# Patient Record
Sex: FEMALE | Race: ASIAN | NOT HISPANIC OR LATINO | Employment: UNEMPLOYED | URBAN - METROPOLITAN AREA
[De-identification: names, ages, dates, MRNs, and addresses within clinical notes are randomized per-mention and may not be internally consistent; named-entity substitution may affect disease eponyms.]

---

## 2020-07-01 ENCOUNTER — OFFICE VISIT (OUTPATIENT)
Dept: OBSTETRICS AND GYNECOLOGY | Facility: CLINIC | Age: 30
End: 2020-07-01
Payer: MEDICAID

## 2020-07-01 VITALS
HEIGHT: 61 IN | SYSTOLIC BLOOD PRESSURE: 112 MMHG | BODY MASS INDEX: 21.71 KG/M2 | WEIGHT: 115 LBS | DIASTOLIC BLOOD PRESSURE: 84 MMHG

## 2020-07-01 DIAGNOSIS — Z01.419 WELL WOMAN EXAM WITH ROUTINE GYNECOLOGICAL EXAM: Primary | ICD-10-CM

## 2020-07-01 DIAGNOSIS — Z12.4 ENCOUNTER FOR PAPANICOLAOU SMEAR FOR CERVICAL CANCER SCREENING: ICD-10-CM

## 2020-07-01 PROCEDURE — 99385 PREV VISIT NEW AGE 18-39: CPT | Mod: S$GLB,,, | Performed by: OBSTETRICS & GYNECOLOGY

## 2020-07-01 PROCEDURE — 99385 PR PREVENTIVE VISIT,NEW,18-39: ICD-10-PCS | Mod: S$GLB,,, | Performed by: OBSTETRICS & GYNECOLOGY

## 2020-07-01 NOTE — PATIENT INSTRUCTIONS
The Range of Pap Test Results  When your Pap test is sent to the lab, the lab studies your cell samples and reports any abnormal cell changes. Your health care provider can discuss these changes with you. In some cases, an abnormal Pap test is due to an infection. More serious cell changes range from dysplasia to cancer. Talk to your health care provider about your Pap test.    Normal results  Cervical cells, even normal ones, are always changing. As they mature, normal squamous cells move from deeper layers within the cervix. Over time, these cells flatten and cover the surface of the cervix. Within the cervical canal, the cells are different. These glandular cells are taller and not as flat as the cells on the surface of the cervix. When a Pap test sample shows healthy cells of both types, the results are negative. Keep having Pap tests as often as directed.  Abnormal results  A positive Pap test result means some cells in the sample showed abnormal changes. These results are grouped by the type of cell change and the location, or extent, of the changes. Depending on the results, you may need further testing.  · Inflammation: Noncancerous changes are present. They may be due to normal cell repair. Or, they may be caused by an infection, such as HPV or yeast. Further testing may be needed. (Also called reactive cellular changes.)  · Atypical squamous cells: Test results are unclear. Cells on the surface of the cervix show changes, but their significance is not yet known. Testing for HPV and other sexually transmitted infections (STIs) may be needed. Treatment may be required. (Reported as ASC-US or ASC-H.)  · Atypical glandular cells: Cells lining the cervical canal show abnormal changes. Further testing is likely. You may also have treatment to destroy or remove problem cells. (Reported as AGC.)  · Mild dysplasia: Cells show distinct changes. More testing or HPV typing may be done. You may also have treatment to  destroy or remove problem cells. (Reported as low-grade HANNA or YASHIRA 1.)  · Moderate to severe dysplasia: Cells show precancerous changes. Or, noninvasive cancer (carcinoma in situ) may be present. Treatment to destroy or remove problem cells is likely. (Reported as high-grade HANNA or YASHIRA 2 or YASHIRA 3.)  · Cancer: Different types of cancer may be detected by your Pap test. More tests to assess the cancer's extent are likely. The type of treatment will depend on the test results and other factors, such as age and health history. (Reported as squamous cell carcinoma, endocervical adenocarcinoma in situ, or adenocarcinoma.)  Date Last Reviewed: 5/12/2015  © 7085-2574 [employer]. [address] [place] [address], [place] [address]. All rights reserved. This information is not intended as a substitute for professional medical care. Always follow your healthcare professional's instructions.          When You Have an Abnormal Pap Test    The Pap test is a screening test that checks for cell changes in the cervix, the opening of the uterus. In some cases, it checks for a virus that can cause cervical cancer. If your Pap results were abnormal, you may be worried. But there is no reason to panic. An abnormal Pap test result can mean many things. It may be due to changes (inflammation) caused by normal cell repair or infection. Or you may have a problem called dysplasia that could become cervical cancer. If so, know that dysplasia tends to progress very slowly before becoming cervical cancer. Thats why its so important to have Pap tests as often as directed. Pap tests can show cell changes in the cervix early, when treatment is most effective.  Talk to your health care provider  Be sure to discuss your results with your health care provider. Find out about any follow-up tests youll need. You may be asked to come back for a second Pap test in a few months. Or, you may be scheduled for an exam so your health care provider can  get a closer look at your cervix. In either case, be sure to keep your follow-up visits. They are one of your best safeguards against future problems.  Understanding your risk  Some lifestyle choices can increase your risk of abnormal cell changes. Did you start having sex at a young age? Have you had many sexual partners? Have you had sex without using a latex condom? Do you smoke? If you answered yes to any of these questions, you are more at risk. One of the most common reasons for an abnormal Pap result is infection with the human papillomavirus (HPV). If your Pap results suggest HPV, further testing may be needed.     The Pap test  During the test:  · An instrument called a speculum is inserted into the vagina to hold it open. This lets your health care provider see the cervix.  · A small brush or swab is used to take cells from several areas of the cervix. The cells are put into a liquid or on a slide. They are then sent to a lab where they are studied for changes. Your health care provider will contact you with the results.   Date Last Reviewed: 4/26/2015 © 2000-2017 [employer]. [address] [place] [address]. All rights reserved. This information is not intended as a substitute for professional medical care. Always follow your healthcare professional's instructions.          Human Papillomavirus (HPV)  Does this test have other names?  HPV DNA test, DNA Pap, HPV co-test  What is this test?  This test checks for the human papillomavirus (HPV) around the cervix. HPVs can cause warts, including plantar warts on the bottom of the feet and genital warts. They can also cause different kinds of cancers, including cervical, throat, and anal cancers.  More than 100 types of HPVs have been identified. Relatively few carry a high cancer risk. HPV can travel from person to person during sexual contact. In fact, it's one of the most widely spread sexually transmitted diseases (STDs).    Why do  I need this test?  You may need this test to see whether you have HPV. Because long-term infection with HPV is the greatest risk factor for cervical cancer, this test is commonly used to check women for viruses that could cause this cancer. The test may be done at the same time as a Pap test, which checks for abnormal cervical cells or cervical cancer.  The American Cancer Society (ACS) suggests that women ages 30 and older have a Pap test every 5 years along with an HPV test. Another reasonable choice for women ages 30 to 65 is to get tested every 3 years with just the Pap test.  The HPV test is not recommended as a cervical cancer screening test for sexually active women in their 20s. These women are much more likely to have an HPV infection that will go away on its own, so the results of an HPV test are less likely to be useful. But the HPV test might be done if a woman in her 20s has an abnormal Pap test.  Although HPV also causes anal cancer and healthcare providers can check for signs of abnormal cells in the anus, testing for cancer-causing HPV in the anus is not currently common.   What other tests might I have along with this test?  Your healthcare provider may do a Pap test. This involves collecting a sample to check for abnormal cells. If needed, your provider may also check for gonorrhea and chlamydia, two other STDs.  What do my test results mean?  Many things may affect your lab test results. These include the method each lab uses to do the test. Even if your test results are different from the normal value, you may not have a problem. To learn what the results mean for you, talk with your healthcare provider.  Tests for cervical HPV check for DNA from several types of HPV. Typically, the test will report whether it found types of HPV that could cause cancer. A negative result means that the test didn't find HPV types that could cause cancer. Or it found only types that carry a low risk for cancer. A  positive result means the test found at least one HPV type that could cause cancer. It doesn't mean that you have cancer, although it may mean you need other tests.    How is this test done?  This test requires a sample of cells from your cervix. To collect the sample, your healthcare provider will put a speculum into your vagina so that he or she can reach the cervix. Your provider will use one or more devices--shaped like a spatula, brush, or both--to collect samples of cells in the cervix.  Does this test pose any risks?  This test poses no known risks.  What might affect my test results?  The results don't seem to be affected by menstrual blood or lubricant in the vagina. Little is known about the effect of vaginal intercourse, tampons, and douching shortly before test.   How do I get ready for this test?  Ask your healthcare provider or nurse if you need to do anything to prepare for this test. The ACS recommends avoiding intercourse, douches, tampons, vaginal creams, and birth control foam or jelly 2 to 3 days before a Pap test. Try to schedule the test for at least 5 days after your menstrual period ends.   © 1750-3282 [employer]. [address] [place] [address], [place] [address]. All rights reserved. This information is not intended as a substitute for professional medical care. Always follow your healthcare professional's instructions.

## 2020-07-01 NOTE — PROGRESS NOTES
"  Subjective:      Patient ID: Tia Arteaga is a 30 y.o. female who presents for evaluation today.    Chief Complaint:    Well Woman    History of Present Illness  HPI  Annual Exam-Premenopausal  Patient presents for annual exam. The patient has no complaints today. The patient is not currently sexually active. GYN screening history: last pap: patient does not recall results of last pap and patient does not recall when last pap was. The patient wears seatbelts: yes. The patient participates in regular exercise: yes. Has the patient ever been transfused or tattooed?: no. The patient reports that there is not domestic violence in her life.    GYN History  Patient's last menstrual period was 06/25/2020.   Date of Last Pap: Pap smear completed today with HPV co-testing    VITALS  BP Readings from Last 1 Encounters:   07/01/20 112/84   Weight: 52.2 kg (115 lb)   Height: 5' 1" (154.9 cm)   BMI Readings from Last 1 Encounters:   07/01/20 21.73 kg/m²       FAMILY HISTORY  History reviewed. No pertinent family history.    SOCIAL HISTORY  Social History     Tobacco Use   Smoking Status Never Smoker   Smokeless Tobacco Never Used   ,   Social History     Substance and Sexual Activity   Alcohol Use Yes        MEDICATIONS  No outpatient medications have been marked as taking for the 7/1/20 encounter (Office Visit) with Tisha Massey NP.           Review of Systems   Review of Systems   Constitutional: Negative for activity change, appetite change, chills, diaphoresis, fatigue, fever and unexpected weight change.   Eyes: Negative for visual disturbance.   Respiratory: Negative for cough and shortness of breath.    Cardiovascular: Negative for chest pain, palpitations and leg swelling.   Gastrointestinal: Negative for abdominal pain, bloating, blood in stool, constipation, diarrhea and nausea.   Endocrine: Negative for diabetes, hair loss, hot flashes, hyperthyroidism and hypothyroidism.   Genitourinary: Negative for dysmenorrhea, " dysuria, menstrual problem, vaginal discharge, vaginal pain, urinary incontinence, vaginal dryness and vaginal odor.   Musculoskeletal: Negative for back pain and leg pain.   Integumentary:  Negative for rash, acne, hair changes, mole/lesion, nipple discharge, breast skin changes and breast tenderness.   Neurological: Negative for headaches.   Hematological: Does not bruise/bleed easily.   Psychiatric/Behavioral: Negative for depression and sleep disturbance. The patient is not nervous/anxious.    Breast: Positive for breast self exam.Negative for asymmetry, lump, mastodynia, nipple discharge, skin changes and tenderness       Objective:    Physical Exam:   Constitutional: She is oriented to person, place, and time. She appears well-developed and well-nourished. She is cooperative. She does not appear ill. No distress.    HENT:   Head: Normocephalic and atraumatic.     Neck: Trachea normal and normal range of motion. Neck supple. No thyroid mass and no thyromegaly present.    Cardiovascular: Normal rate, regular rhythm, normal heart sounds and normal pulses.     Pulmonary/Chest: Effort normal and breath sounds normal. No stridor. No respiratory distress. Chest wall is not dull to percussion. She exhibits no mass, no bony tenderness, no laceration, no crepitus, no edema, no deformity, no swelling and no retraction. Right breast exhibits no inverted nipple, no mass, no nipple discharge, no skin change, no tenderness, presence, no bleeding and no swelling. Left breast exhibits no inverted nipple, no mass, no nipple discharge, no skin change, no tenderness, presence, no bleeding and no swelling. Breasts are symmetrical.        Abdominal: Soft. Normal appearance and bowel sounds are normal. She exhibits no distension. There is no abdominal tenderness. No hernia.     Genitourinary:    Vagina, uterus and rectum normal.      Pelvic exam was performed with patient supine.   There is no rash, tenderness, lesion or injury on  the right labia. There is no rash, tenderness, lesion or injury on the left labia. Cervix is normal. Right adnexum displays no mass, no tenderness and no fullness. Left adnexum displays no mass, no tenderness and no fullness. No tenderness or bleeding in the vagina.    No foreign body in the vagina.   Labial bartholins normal.Additional cervical findings: pap smear done          Musculoskeletal: Normal range of motion and moves all extremeties.       Neurological: She is alert and oriented to person, place, and time.    Skin: Skin is warm and dry. No rash noted. She is not diaphoretic. No erythema. No pallor.    Psychiatric: She has a normal mood and affect. Her speech is normal and behavior is normal. Judgment and thought content normal.              Assessment:        1. Well woman exam with routine gynecological exam    2. Encounter for Papanicolaou smear for cervical cancer screening           Plan:   Pap smear obtained today. Plan for CBC, CMP, lipid panel & TSH. Patient was counseled today on current ASCCP pap smear guidelines, the recommendation for yearly pelvic and clinical breast exams, healthy diet consumption, implementing exercise routines 4-5x/week, when to begin mammogram screenings, and breast self awareness. She is to see her PCP for other health maintenance.  Patient instructed to contact the clinic should any questions or concerns arise prior to her next office visit. Patient is happy with the plan of care at this time, verbalizes understanding and denies outstanding questions.

## 2020-07-03 LAB
CHLAMYDIA: NEGATIVE
GONORRHEA: NEGATIVE
SOURCE: NORMAL
SOURCE: NORMAL
TRICHOMONAS AMPLIFIED: NEGATIVE

## 2020-07-07 LAB
ABS NRBC COUNT: 0 X 10 3/UL (ref 0–0.01)
ABSOLUTE BASOPHIL: 0.05 X 10 3/UL (ref 0–0.22)
ABSOLUTE EOSINOPHIL: 0.64 X 10 3/UL (ref 0.04–0.54)
ABSOLUTE IMMATURE GRAN: 0.02 X 10 3/UL (ref 0–0.04)
ABSOLUTE LYMPHOCYTE: 2.52 X 10 3/UL (ref 0.86–4.75)
ABSOLUTE MONOCYTE: 0.37 X 10 3/UL (ref 0.22–1.08)
ALBUMIN SERPL-MCNC: 4.8 G/DL (ref 3.5–5.2)
ALBUMIN/GLOB SERPL ELPH: 1.9 {RATIO} (ref 1–2.7)
ALP ISOS SERPL LEV INH-CCNC: 57 U/L (ref 35–105)
ALT (SGPT): 27 U/L (ref 0–33)
ANION GAP SERPL CALC-SCNC: 11 MMOL/L (ref 8–17)
AST SERPL-CCNC: 24 U/L (ref 0–32)
BASOPHILS NFR BLD: 0.7 % (ref 0.2–1.2)
BILIRUBIN, TOTAL: 0.32 MG/DL (ref 0–1.2)
BUN/CREAT SERPL: 13.9 (ref 6–20)
CALCIUM SERPL-MCNC: 9.5 MG/DL (ref 8.6–10.2)
CARBON DIOXIDE, CO2: 30 MMOL/L (ref 22–29)
CHLORIDE: 104 MMOL/L (ref 98–107)
CHOLEST SERPL-MSCNC: 244 MG/DL (ref 100–200)
COVID-19 (SARS-COV-2) TOTAL ANTIBODIES: NONREACTIVE
CREAT SERPL-MCNC: 0.85 MG/DL (ref 0.5–0.9)
EOSINOPHIL NFR BLD: 8.6 % (ref 0.7–7)
GFR ESTIMATION: 77.51
GLOBULIN: 2.5 G/DL (ref 1.5–4.5)
GLUCOSE: 91 MG/DL (ref 74–106)
HCT VFR BLD AUTO: 47.4 % (ref 37–47)
HDLC SERPL-MCNC: 77 MG/DL
HGB BLD-MCNC: 14.6 G/DL (ref 12–16)
IMMATURE GRANULOCYTES: 0.3 % (ref 0–0.5)
LDL/HDL RATIO: 1.8 (ref 1–3)
LDLC SERPL CALC-MCNC: 139.4 MG/DL (ref 0–100)
LYMPHOCYTES NFR BLD: 33.9 % (ref 19.3–53.1)
MCH RBC QN AUTO: 27.7 PG (ref 27–32)
MCHC RBC AUTO-ENTMCNC: 30.8 G/DL (ref 32–36)
MCV RBC AUTO: 89.8 FL (ref 82–100)
MONOCYTES NFR BLD: 5 % (ref 4.7–12.5)
NEUTROPHILS ABSOLUTE COUNT: 3.83 X 10 3/UL (ref 2.15–7.56)
NEUTROPHILS NFR BLD: 51.5 %
NUCLEATED RED BLOOD CELLS: 0 /100 WBC (ref 0–0.2)
PLATELET # BLD AUTO: 357 X 10 3/UL (ref 135–400)
POTASSIUM: 4.3 MMOL/L (ref 3.5–5.1)
PROT SNV-MCNC: 7.3 G/DL (ref 6.4–8.3)
RBC # BLD AUTO: 5.28 X 10 6/UL (ref 4.2–5.4)
RDW-SD: 40.3 FL (ref 37–54)
SODIUM: 145 MMOL/L (ref 136–145)
TRIGL SERPL-MCNC: 138 MG/DL (ref 0–150)
TSH SERPL DL<=0.005 MIU/L-ACNC: 1.71 UIU/ML (ref 0.27–4.2)
UREA NITROGEN (BUN): 11.8 MG/DL (ref 6–20)
WBC # BLD: 7.43 X 10 3/UL (ref 4.3–10.8)

## 2023-01-25 RX ORDER — FLUCONAZOLE 150 MG/1
150 TABLET ORAL WEEKLY
Qty: 2 TABLET | Refills: 2 | Status: SHIPPED | OUTPATIENT
Start: 2023-01-25 | End: 2023-01-26

## 2023-01-25 RX ORDER — AMOXICILLIN 875 MG/1
875 TABLET, FILM COATED ORAL EVERY 12 HOURS
Qty: 14 TABLET | Refills: 0 | Status: SHIPPED | OUTPATIENT
Start: 2023-01-25

## 2024-09-07 ENCOUNTER — EMERGENCY (EMERGENCY)
Facility: HOSPITAL | Age: 34
LOS: 1 days | Discharge: HOME | End: 2024-09-07
Admitting: EMERGENCY MEDICINE
Payer: SELF-PAY

## 2024-09-07 VITALS
OXYGEN SATURATION: 99 % | TEMPERATURE: 98 F | HEART RATE: 98 BPM | DIASTOLIC BLOOD PRESSURE: 88 MMHG | HEIGHT: 62 IN | RESPIRATION RATE: 16 BRPM | SYSTOLIC BLOOD PRESSURE: 146 MMHG | WEIGHT: 104.06 LBS

## 2024-09-07 DIAGNOSIS — T23.172A BURN OF FIRST DEGREE OF LEFT WRIST, INITIAL ENCOUNTER: ICD-10-CM

## 2024-09-07 DIAGNOSIS — T20.17XA BURN OF FIRST DEGREE OF NECK, INITIAL ENCOUNTER: ICD-10-CM
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
## 2024-09-07 DIAGNOSIS — T20.13XA BURN OF FIRST DEGREE OF CHIN, INITIAL ENCOUNTER: ICD-10-CM
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
## 2024-09-07 DIAGNOSIS — T21.11XA BURN OF FIRST DEGREE OF CHEST WALL, INITIAL ENCOUNTER: ICD-10-CM

## 2024-09-07 DIAGNOSIS — T31.0 BURNS INVOLVING LESS THAN 10% OF BODY SURFACE: ICD-10-CM

## 2024-09-07 PROCEDURE — 99053 MED SERV 10PM-8AM 24 HR FAC: CPT

## 2024-09-07 PROCEDURE — 99283 EMERGENCY DEPT VISIT LOW MDM: CPT
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
## 2024-09-07 NOTE — ED PROVIDER NOTE - NSFOLLOWUPINSTRUCTIONS_ED_ALL_ED_FT
Burn Care, Adult  A hand with a reddened and blistered area from a burn.  A burn is an injury to the skin or the tissues under the skin. It may be caused by a fire, hot liquid or steam, chemicals, electricity, or the sun. There are three types of burns:  First degree. These burns are similar to a sunburn. They may cause your skin to be red, slightly swollen, and tender. They may be treated at home.  Second degree. These burns are very painful. They may cause your skin to turn very red, swell, leak fluid, look shiny, and blister. In many cases, these burns may be treated at home. If they cover your hands, feet, face, or genitals, get help from a health care provider.  Third degree. These burns are the most severe. They may not be painful, but you may feel pain around the edges of them. Your skin may turn white or black and may look charred, dry, and leathery. These burns cause lasting damage. If you get a third-degree burn, get help right away.  Treatment will depend on the type of burn you have. Taking care of your burn can help to prevent pain and infection. It can also help the burn heal more quickly.    How to care for a first-degree burn  Right after the burn:    Rinse or soak the burn under cool water for 5 minutes or more.  Put a cool, wet cloth (cool compress) on your skin. This may help with pain.  Do not put ice on your burn. This can cause more damage.  Caring for the burn    Clean and care for the burn as told by your provider. You may be told to:  Use mild soap and water to clean the area.  Use a clean cloth to pat the burned area dry after cleaning it. Do not rub or scrub the burn.  Put lotion or aloe vera gel on your skin.  How to care for a second-degree burn  Right after the burn:    Rinse or soak the burn under cool water. Do this for 5–10 minutes.  Do not put ice on your burn. This can cause more damage.  Take off any jewelry or clothing near the burn.  Lightly cover the burn with a clean cloth.  Caring for the burn    Clean and care for the burn as told by your provider. You may be told to:  Clean or rinse out the burned area.  Put a cream or ointment on the burn. You may need to use an antibiotic cream that has silver in it. This can kill bacteria.  Place a germ-free (sterile) dressing over the burn. A dressing is a bandage that is put over a burn to help it heal.  Raise (elevate) the injured area above the level of your heart while you are sitting or lying down.  How to care for a third-degree burn  Right after the burn:    Lightly cover the burn with a clean, dry cloth.  Get help right away. You may need to:  Stay in the hospital.  Have surgery to remove burned tissue or get a skin graft.  Get fluids through an IV.  Caring for the burn    Clean and care for the burn as told by your provider. You may be told to:  Clean or rinse out the burn.  Put a cream or ointment on the burn.  Put a sterile dressing in the burned area (packing).  Put a sterile dressing over the burn.  Use pressure (compression) dressings.  Elevate the injured area above the level of your heart while you are sitting or lying down.  Wear splints or immobilizers as told by your provider.  Do exercises as told by your provider.  Rest as told by your provider. Do not do sports or other physical activities until your provider says that you can.  How to prevent infection when caring for a burn  Washing hands with soap and water.  Take these steps to prevent infection and more damage to the tissue. Make sure you:  Wash your hands with soap and water for at least 20 seconds before and after you care for your burn. If soap and water are not available, use hand .  Wear clean gloves as told by your provider.  Do not put butter, oil, toothpaste, or other home remedies on the burn.  Do not scratch or pick at the burn.  Do not break any blisters.  Do not peel the skin.  Do not rub your burn, even when cleaning it.  Check your burn every day for signs of infection. Check for:  More redness, swelling, or pain.  Warmth.  Pus or a bad smell.  Red streaks around the burn.  Follow these instructions at home  Medicines    Take over-the-counter and prescription medicines only as told by your provider.  If you were prescribed antibiotics, take or apply them as told by your provider. Do not stop using the antibiotic even if you start to feel better.  General instructions    Do not use any products that contain nicotine or tobacco. These products include cigarettes, chewing tobacco, and vaping devices, such as e-cigarettes. If you need help quitting, ask your provider.  Drink enough fluid to keep your pee (urine) pale yellow.  Protect your burn from the sun.  Contact a health care provider if:  Your burn does not get better, or it gets worse.  You have any signs of infection.  Your burn starts to look different or gets black or red spots.  Your pain does not get better with medicine.  You have anxiety or depression after the injury.  Get help right away if:  You have red streaks near the burn.  You are in severe pain.  This information is not intended to replace advice given to you by your health care provider. Make sure you discuss any questions you have with your health care provider.

## 2024-09-07 NOTE — ED ADULT NURSE NOTE - NSFALLUNIVINTERV_ED_ALL_ED
Bed/Stretcher in lowest position, wheels locked, appropriate side rails in place/Call bell, personal items and telephone in reach/Instruct patient to call for assistance before getting out of bed/chair/stretcher/Non-slip footwear applied when patient is off stretcher/[place] to call system/Physically safe environment - no spills, clutter or unnecessary equipment/Purposeful proactive rounding/Room/bathroom lighting operational, light cord in reach

## 2024-09-07 NOTE — ED PROVIDER NOTE - PATIENT PORTAL LINK FT
You can access the FollowMyHealth Patient Portal offered by [facility] by registering at the following website: [email or web address]. By joining [employer]’s FollowMyHealth portal, you will also be able to view your health information using other applications (apps) compatible with our system.
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
## 2024-09-07 NOTE — ED ADULT NURSE NOTE - CAS TRG GENERAL AIRWAY, MLM
04/23/24                            Ashleigh Ji  [address] [facility] [address]    To Whom It May Concern:    This is to certify Ashleigh Ji was seen in office with Clark Rubin MD on 04/23/24.      If you have any questions you can call our office at the number below.        Electronically signed by:  Clark Rubin MD  [facility]  [address] [place] [address]  Dept Phone: [phone]       
Patent